# Patient Record
Sex: FEMALE | Race: WHITE | NOT HISPANIC OR LATINO | Employment: UNEMPLOYED | URBAN - NONMETROPOLITAN AREA
[De-identification: names, ages, dates, MRNs, and addresses within clinical notes are randomized per-mention and may not be internally consistent; named-entity substitution may affect disease eponyms.]

---

## 2017-09-17 ENCOUNTER — HOSPITAL ENCOUNTER (EMERGENCY)
Facility: HOSPITAL | Age: 37
Discharge: HOME | End: 2017-09-17
Admitting: EMERGENCY MEDICINE

## 2017-09-17 VITALS
WEIGHT: 108 LBS | RESPIRATION RATE: 16 BRPM | HEIGHT: 62 IN | DIASTOLIC BLOOD PRESSURE: 65 MMHG | SYSTOLIC BLOOD PRESSURE: 121 MMHG | OXYGEN SATURATION: 99 % | TEMPERATURE: 98.1 F | BODY MASS INDEX: 19.88 KG/M2 | HEART RATE: 61 BPM

## 2017-09-17 DIAGNOSIS — S61.411A LACERATION OF PALM, RIGHT, INITIAL ENCOUNTER: Primary | ICD-10-CM

## 2017-09-17 PROCEDURE — 99283 EMERGENCY DEPT VISIT LOW MDM: CPT

## 2017-09-17 PROCEDURE — 90471 IMMUNIZATION ADMIN: CPT | Performed by: PHYSICIAN ASSISTANT

## 2017-09-17 PROCEDURE — 25010000002 TDAP 5-2.5-18.5 LF-MCG/0.5 SUSPENSION: Performed by: PHYSICIAN ASSISTANT

## 2017-09-17 PROCEDURE — 90715 TDAP VACCINE 7 YRS/> IM: CPT | Performed by: PHYSICIAN ASSISTANT

## 2017-09-17 RX ORDER — DICLOFENAC SODIUM 75 MG/1
75 TABLET, DELAYED RELEASE ORAL 2 TIMES DAILY
Qty: 14 TABLET | Refills: 0 | Status: SHIPPED | OUTPATIENT
Start: 2017-09-17

## 2017-09-17 RX ORDER — CEPHALEXIN 500 MG/1
500 CAPSULE ORAL 2 TIMES DAILY
Qty: 20 CAPSULE | Refills: 0 | Status: SHIPPED | OUTPATIENT
Start: 2017-09-17

## 2017-09-17 RX ORDER — LIDOCAINE HYDROCHLORIDE 10 MG/ML
10 INJECTION, SOLUTION INFILTRATION; PERINEURAL ONCE
Status: DISCONTINUED | OUTPATIENT
Start: 2017-09-17 | End: 2017-09-17 | Stop reason: HOSPADM

## 2017-09-17 RX ADMIN — TETANUS TOXOID, REDUCED DIPHTHERIA TOXOID AND ACELLULAR PERTUSSIS VACCINE, ADSORBED 0.5 ML: 5; 2.5; 8; 8; 2.5 SUSPENSION INTRAMUSCULAR at 17:40

## 2024-02-22 ENCOUNTER — TRANSCRIBE ORDERS (OUTPATIENT)
Dept: ADMINISTRATIVE | Facility: HOSPITAL | Age: 44
End: 2024-02-22
Payer: COMMERCIAL

## 2024-02-22 DIAGNOSIS — Z12.31 ENCOUNTER FOR SCREENING MAMMOGRAM FOR MALIGNANT NEOPLASM OF BREAST: Primary | ICD-10-CM

## 2024-03-03 LAB
NCCN CRITERIA FLAG: ABNORMAL
TYRER CUZICK SCORE: 11.1

## 2024-03-21 ENCOUNTER — TELEPHONE (OUTPATIENT)
Dept: GASTROENTEROLOGY | Facility: CLINIC | Age: 44
End: 2024-03-21
Payer: COMMERCIAL

## 2024-03-21 PROBLEM — Z12.11 ENCOUNTER FOR SCREENING FOR MALIGNANT NEOPLASM OF COLON: Status: ACTIVE | Noted: 2024-03-21

## 2024-03-21 PROBLEM — Z80.0 FH: COLON CANCER: Status: ACTIVE | Noted: 2024-03-21

## 2024-04-13 PROCEDURE — 88312 SPECIAL STAINS GROUP 1: CPT | Performed by: GENERAL PRACTICE

## 2024-04-13 PROCEDURE — 88304 TISSUE EXAM BY PATHOLOGIST: CPT | Performed by: GENERAL PRACTICE

## 2024-04-16 ENCOUNTER — LAB REQUISITION (OUTPATIENT)
Dept: LAB | Facility: HOSPITAL | Age: 44
End: 2024-04-16
Payer: COMMERCIAL

## 2024-04-16 DIAGNOSIS — Z00.00 ENCOUNTER FOR GENERAL ADULT MEDICAL EXAMINATION WITHOUT ABNORMAL FINDINGS: ICD-10-CM

## 2024-04-19 LAB
CYTO UR: NORMAL
LAB AP CASE REPORT: NORMAL
LAB AP CLINICAL INFORMATION: NORMAL
Lab: NORMAL
PATH REPORT.FINAL DX SPEC: NORMAL
PATH REPORT.GROSS SPEC: NORMAL

## 2024-10-25 ENCOUNTER — LAB REQUISITION (OUTPATIENT)
Dept: LAB | Facility: HOSPITAL | Age: 44
End: 2024-10-25
Payer: COMMERCIAL

## 2024-10-25 ENCOUNTER — OFFICE VISIT (OUTPATIENT)
Dept: WOUND CARE | Facility: HOSPITAL | Age: 44
End: 2024-10-25
Payer: COMMERCIAL

## 2024-10-25 DIAGNOSIS — L02.411 CUTANEOUS ABSCESS OF RIGHT AXILLA: ICD-10-CM

## 2024-10-25 PROCEDURE — 87147 CULTURE TYPE IMMUNOLOGIC: CPT | Performed by: NURSE PRACTITIONER

## 2024-10-25 PROCEDURE — 87070 CULTURE OTHR SPECIMN AEROBIC: CPT | Performed by: NURSE PRACTITIONER

## 2024-10-25 PROCEDURE — G0463 HOSPITAL OUTPT CLINIC VISIT: HCPCS

## 2024-10-25 PROCEDURE — 87205 SMEAR GRAM STAIN: CPT | Performed by: NURSE PRACTITIONER

## 2024-10-25 PROCEDURE — 87186 SC STD MICRODIL/AGAR DIL: CPT | Performed by: NURSE PRACTITIONER

## 2024-10-25 PROCEDURE — 10060 I&D ABSCESS SIMPLE/SINGLE: CPT

## 2024-10-27 LAB
BACTERIA SPEC AEROBE CULT: ABNORMAL
GRAM STN SPEC: ABNORMAL
GRAM STN SPEC: ABNORMAL

## 2024-10-30 ENCOUNTER — OFFICE VISIT (OUTPATIENT)
Dept: WOUND CARE | Facility: HOSPITAL | Age: 44
End: 2024-10-30
Payer: COMMERCIAL

## 2024-10-30 PROCEDURE — G0463 HOSPITAL OUTPT CLINIC VISIT: HCPCS

## 2025-03-25 ENCOUNTER — TRANSCRIBE ORDERS (OUTPATIENT)
Dept: ADMINISTRATIVE | Facility: HOSPITAL | Age: 45
End: 2025-03-25
Payer: COMMERCIAL

## 2025-03-25 DIAGNOSIS — Z12.31 ENCOUNTER FOR SCREENING MAMMOGRAM FOR MALIGNANT NEOPLASM OF BREAST: ICD-10-CM

## 2025-03-25 DIAGNOSIS — N95.8 OTHER SPECIFIED MENOPAUSAL AND PERIMENOPAUSAL DISORDERS: Primary | ICD-10-CM

## 2025-04-02 ENCOUNTER — HOSPITAL ENCOUNTER (OUTPATIENT)
Dept: MAMMOGRAPHY | Facility: HOSPITAL | Age: 45
Discharge: HOME | End: 2025-04-02
Payer: COMMERCIAL

## 2025-04-02 ENCOUNTER — TELEPHONE (OUTPATIENT)
Dept: GENETICS | Facility: HOSPITAL | Age: 45
End: 2025-04-02
Payer: COMMERCIAL

## 2025-04-02 ENCOUNTER — HOSPITAL ENCOUNTER (OUTPATIENT)
Dept: BONE DENSITY | Facility: HOSPITAL | Age: 45
Discharge: HOME | End: 2025-04-02
Payer: COMMERCIAL

## 2025-04-02 DIAGNOSIS — Z12.31 ENCOUNTER FOR SCREENING MAMMOGRAM FOR MALIGNANT NEOPLASM OF BREAST: ICD-10-CM

## 2025-04-02 DIAGNOSIS — N95.8 OTHER SPECIFIED MENOPAUSAL AND PERIMENOPAUSAL DISORDERS: ICD-10-CM

## 2025-04-02 PROCEDURE — 77067 SCR MAMMO BI INCL CAD: CPT

## 2025-04-02 PROCEDURE — 77080 DXA BONE DENSITY AXIAL: CPT

## 2025-04-02 PROCEDURE — 77063 BREAST TOMOSYNTHESIS BI: CPT

## 2025-04-02 NOTE — TELEPHONE ENCOUNTER
At her mammography appointment, the patient indicated that she is interested in genetic testing.  I resent her assessment link and contacted the patient via phone.  I requested that she update the risk assessment and I would coordinate care once we determine that she meets criteria.  She verbalized understanding.

## 2025-04-15 ENCOUNTER — OFFICE VISIT (OUTPATIENT)
Dept: GASTROENTEROLOGY | Facility: CLINIC | Age: 45
End: 2025-04-15
Payer: COMMERCIAL

## 2025-04-15 VITALS
WEIGHT: 116 LBS | HEART RATE: 86 BPM | SYSTOLIC BLOOD PRESSURE: 110 MMHG | HEIGHT: 63 IN | BODY MASS INDEX: 20.55 KG/M2 | DIASTOLIC BLOOD PRESSURE: 68 MMHG | TEMPERATURE: 97.7 F

## 2025-04-15 DIAGNOSIS — Z80.0 FH: COLON CANCER: Primary | ICD-10-CM

## 2025-04-15 RX ORDER — TRAZODONE HYDROCHLORIDE 50 MG/1
100 TABLET ORAL NIGHTLY PRN
COMMUNITY

## 2025-04-15 RX ORDER — PRAZOSIN HYDROCHLORIDE 1 MG/1
1 CAPSULE ORAL NIGHTLY
COMMUNITY

## 2025-04-15 NOTE — PROGRESS NOTES
Primary Physician: Ewelina Payan APRN    Chief Complaint   Patient presents with    Colon Cancer Screening     Pt presents today for evaluation for screening colonoscopy; Family history of colon cancer       Subjective     Archana Burrell is a 44 y.o. female.    HPI  Family history of colon cancer  Patient seen in the office today for consideration of a colonoscopy screening given her family history of colon cancer.  Patient's paternal grandfather and paternal uncle had colon cancer.    Patient denies any change in bowel habits.  No diarrhea, constipation, abdominal pain, or rectal bleeding.    Past Medical History:   Diagnosis Date    ADHD (attention deficit hyperactivity disorder)     Anemia     Anxiety     Depression     Family history of colon cancer     Mixed hyperlipidemia     Vitamin D deficiency        Past Surgical History:   Procedure Laterality Date    HYSTERECTOMY      TUBAL ABDOMINAL LIGATION          Current Outpatient Medications:     Cholecalciferol 125 MCG (5000 UT) tablet, Take 1 tablet by mouth Daily., Disp: , Rfl:     prazosin (MINIPRESS) 1 MG capsule, Take 1 capsule by mouth Every Night., Disp: , Rfl:     sertraline (ZOLOFT) 50 MG tablet, Take 1 tablet by mouth Daily., Disp: , Rfl:     traZODone (DESYREL) 50 MG tablet, Take 2 tablets by mouth At Night As Needed., Disp: , Rfl:     Allergies   Allergen Reactions    Erythromycin Hallucinations    Azithromycin Other (See Comments)       Social History     Socioeconomic History    Marital status:    Tobacco Use    Smoking status: Former     Current packs/day: 0.50     Types: Cigarettes    Smokeless tobacco: Never   Vaping Use    Vaping status: Every Day   Substance and Sexual Activity    Alcohol use: Not Currently    Drug use: Defer    Sexual activity: Defer       Family History   Problem Relation Age of Onset    Breast cancer Paternal Aunt     Colon cancer Paternal Uncle         In his late 50's    Pancreatic cancer Paternal Grandmother   "   Colon cancer Paternal Grandfather         In his 60's    Liver cancer Neg Hx     Stomach cancer Neg Hx     Rectal cancer Neg Hx     Liver disease Neg Hx     Colon polyps Neg Hx     Esophageal cancer Neg Hx        Review of Systems   Constitutional:  Negative for unexpected weight change.   Respiratory:  Negative for shortness of breath.    Cardiovascular:  Negative for chest pain.       Objective     /68 (BP Location: Left arm, Patient Position: Sitting, Cuff Size: Adult)   Pulse 86   Temp 97.7 °F (36.5 °C) (Infrared)   Ht 160 cm (63\")   Wt 52.6 kg (116 lb)   Breastfeeding No   BMI 20.55 kg/m²     Physical Exam  Vitals reviewed.   Constitutional:       Appearance: Normal appearance.   Cardiovascular:      Rate and Rhythm: Normal rate and regular rhythm.      Heart sounds: Normal heart sounds.   Pulmonary:      Effort: Pulmonary effort is normal.      Breath sounds: Normal breath sounds.   Neurological:      Mental Status: She is alert.           IMPRESSION/PLAN:    Assessment & Plan      Problem List Items Addressed This Visit       FH: colon cancer - Primary    Overview   Paternal grandfather colon cancer in his 60's, uncle in his 50's         Relevant Orders    Case Request (Completed)    Follow Anesthesia Guidelines / Protocol     Colonoscopy per Dr. Archibald  Miralax Prep          ..The risks, benefits, and alternatives of colonoscopy were reviewed with the patient today.  Risks including perforation of the colon possibly requiring surgery or colostomy.  Additional risks include risk of bleeding from biopsies or removal of colon tissue.  There is also the risk of a drug reaction or problems with anesthesia.  This will be discussed with the further by the anesthesia team on the day of the procedure.  Lastly there is a possibility of missing a colon polyp or cancer.  The benefits include the diagnosis and management of disease of the colon and rectum.  Alternatives to colonoscopy include barium enema, " laboratory testing, radiographic evaluation, or no intervention.  The patient verbalizes understanding and agrees.    In accordance with requirements under the Affordable Care Act, [facility] has provided pricing for all hospital services and items on each of its websites. However, a patient's actual cost may differ based on the services the patient receives to meet individual healthcare needs and based on the benefits provided under the patient’s insurance coverage.        Meseret Pimentel, APRN  04/15/25  09:29 CDT    Part of this note may be an electronic transcription/translation of spoken language to printed text.

## 2025-06-19 ENCOUNTER — TELEPHONE (OUTPATIENT)
Dept: GASTROENTEROLOGY | Facility: HOSPITAL | Age: 45
End: 2025-06-19
Payer: COMMERCIAL